# Patient Record
Sex: MALE | Race: WHITE | URBAN - METROPOLITAN AREA
[De-identification: names, ages, dates, MRNs, and addresses within clinical notes are randomized per-mention and may not be internally consistent; named-entity substitution may affect disease eponyms.]

---

## 2020-09-21 DIAGNOSIS — E11.9 CONTROLLED TYPE 2 DIABETES MELLITUS WITHOUT COMPLICATION, UNSPECIFIED WHETHER LONG TERM INSULIN USE (HCC): Primary | ICD-10-CM

## 2020-09-21 RX ORDER — METFORMIN HYDROCHLORIDE 500 MG/1
500 TABLET, EXTENDED RELEASE ORAL 2 TIMES DAILY WITH MEALS
Qty: 90 TABLET | Refills: 1 | Status: SHIPPED | OUTPATIENT
Start: 2020-09-21 | End: 2020-09-29 | Stop reason: CLARIF

## 2020-09-21 RX ORDER — METFORMIN HYDROCHLORIDE 500 MG/1
TABLET, EXTENDED RELEASE ORAL
COMMUNITY
End: 2020-09-21 | Stop reason: SDUPTHER

## 2024-02-28 ENCOUNTER — TELEPHONE (OUTPATIENT)
Age: 69
End: 2024-02-28

## 2024-02-28 NOTE — TELEPHONE ENCOUNTER
Caller: Patient    Doctor: Sangita Hooks    Reason for call: Patient called to schedule appointment for Trigger Finger, requesting Dr Hooks.  Patient was scheduled for initial visit 4/24.    Patient would just like to know if there is any inherent risk or issue with waiting this period (~2 month) for the appointment, as he is not familiar with Trigger Finger or if it could worsen with time.    Please call patient to advise.    Call back#: [phone]

## 2024-03-27 ENCOUNTER — OFFICE VISIT (OUTPATIENT)
Dept: OBGYN CLINIC | Facility: HOSPITAL | Age: 69
End: 2024-03-27
Payer: MEDICARE

## 2024-03-27 VITALS
DIASTOLIC BLOOD PRESSURE: 76 MMHG | BODY MASS INDEX: 29.87 KG/M2 | WEIGHT: 253 LBS | HEIGHT: 77 IN | HEART RATE: 71 BPM | SYSTOLIC BLOOD PRESSURE: 144 MMHG

## 2024-03-27 DIAGNOSIS — M65.331 TRIGGER MIDDLE FINGER OF RIGHT HAND: Primary | ICD-10-CM

## 2024-03-27 PROCEDURE — 99204 OFFICE O/P NEW MOD 45 MIN: CPT | Performed by: ORTHOPAEDIC SURGERY

## 2024-03-27 PROCEDURE — 20550 NJX 1 TENDON SHEATH/LIGAMENT: CPT | Performed by: ORTHOPAEDIC SURGERY

## 2024-03-27 RX ORDER — LIDOCAINE HYDROCHLORIDE 10 MG/ML
1 INJECTION, SOLUTION INFILTRATION; PERINEURAL
Status: COMPLETED | OUTPATIENT
Start: 2024-03-27 | End: 2024-03-27

## 2024-03-27 RX ORDER — BETAMETHASONE SODIUM PHOSPHATE AND BETAMETHASONE ACETATE 3; 3 MG/ML; MG/ML
6 INJECTION, SUSPENSION INTRA-ARTICULAR; INTRALESIONAL; INTRAMUSCULAR; SOFT TISSUE
Status: COMPLETED | OUTPATIENT
Start: 2024-03-27 | End: 2024-03-27

## 2024-03-27 RX ORDER — ATORVASTATIN CALCIUM 20 MG/1
20 TABLET, FILM COATED ORAL DAILY
COMMUNITY

## 2024-03-27 RX ORDER — ATENOLOL 25 MG/1
1 TABLET ORAL EVERY MORNING
COMMUNITY

## 2024-03-27 RX ADMIN — LIDOCAINE HYDROCHLORIDE 1 ML: 10 INJECTION, SOLUTION INFILTRATION; PERINEURAL at 13:15

## 2024-03-27 RX ADMIN — BETAMETHASONE SODIUM PHOSPHATE AND BETAMETHASONE ACETATE 6 MG: 3; 3 INJECTION, SUSPENSION INTRA-ARTICULAR; INTRALESIONAL; INTRAMUSCULAR; SOFT TISSUE at 13:15

## 2024-03-27 NOTE — PROGRESS NOTES
ASSESSMENT/PLAN:    Assessment:   Trigger Finger  right  long finger    Plan:   CS injection offered and accepted at time of visit, patient tolerated well  Resume activities as tolerated  Patient will follow-up in 6-8 weeks for re-evaluation    Follow Up:  6-8 weeks    To Do Next Visit:   Re-evaluation    General Discussions:  Trigger FInger: The anatomy and physiology of trigger finger was discussed with the patient today in the office.  Edema and increased contact pressure within the flexor tendons at the A1 pulley can cause pain, crepitation, and limitation of function.  Treatment options include resting MP blocking splints to decrease edema, oral anti-inflammatory medications, home or formal therapy exercises, up to 2 steroid injections within the tendon sheath, or surgical release.  While majority of patients do respond to conservative treatment, up to 20% may require surgical release.     Operative Discussions:    _____________________________________________________  CHIEF COMPLAINT:  Chief Complaint   Patient presents with    Right Middle Finger - Clicking         SUBJECTIVE:  Tony Cabral is a 68 y.o. male who presents with right long finger clicking and locking that started approximately 2 months ago with no known mechanism.  Radiation: None  Previous Treatments: None   Associated symptoms: Catching and Locking  Handedness: right  Work status: Consultant    Right middle finger - approximately 2 months.   Previous reaction to Kenalog injection    PAST MEDICAL HISTORY:  Past Medical History:   Diagnosis Date    DVT (deep venous thrombosis) (HCC) 2020    Sleep apnea        PAST SURGICAL HISTORY:  Past Surgical History:   Procedure Laterality Date    ANKLE SURGERY      CATARACT EXTRACTION, BILATERAL      KNEE SURGERY Bilateral     WISDOM TOOTH EXTRACTION         FAMILY HISTORY:  Family History   Problem Relation Age of Onset    COPD Mother     Heart attack Mother     Arthritis Mother     Arthritis Father   "   Heart attack Father        SOCIAL HISTORY:  Social History     Tobacco Use    Smoking status: Never    Smokeless tobacco: Never   Vaping Use    Vaping status: Never Used   Substance Use Topics    Alcohol use: Yes    Drug use: Never       MEDICATIONS:    Current Outpatient Medications:     atenolol (TENORMIN) 25 mg tablet, Take 1 tablet by mouth every morning, Disp: , Rfl:     atorvastatin (LIPITOR) 20 mg tablet, Take 20 mg by mouth daily, Disp: , Rfl:     Cyanocobalamin (VITAMIN B 12 PO), Take by mouth, Disp: , Rfl:     TAMSULOSIN HCL PO, Take by mouth, Disp: , Rfl:     ALLERGIES:  Allergies   Allergen Reactions    Triamcinolone Swelling       REVIEW OF SYSTEMS:  Pertinent items are noted in HPI.  A comprehensive review of systems was negative.    LABS:  HgA1c:   Lab Results   Component Value Date    HGBA1C 6.2 (H) 09/26/2023     BMP:   Lab Results   Component Value Date    CALCIUM 9.6 12/27/2023    K 4.1 12/27/2023    CO2 27 12/27/2023     12/27/2023    BUN 20 12/27/2023    CREATININE 1.32 (H) 12/27/2023         _____________________________________________________  PHYSICAL EXAMINATION:  Vital signs: /76   Pulse 71   Ht 6' 5\" (1.956 m)   Wt 115 kg (253 lb)   BMI 30.00 kg/m²   General: well developed and well nourished, alert, oriented times 3, and appears comfortable  Psychiatric: Normal  HEENT: Trachea Midline, No torticollis  Cardiovascular: No discernable arrhythmia  Pulmonary: No wheezing or stridor  Abdomen: No rebound or guarding  Extremities: No peripheral edema  Skin: No masses, erythema, lacerations, fluctation, ulcerations  Neurovascular: Sensation Intact to the Median, Ulnar, Radial Nerve, Motor Intact to the Median, Ulnar, Radial Nerve, and Pulses Intact    MUSCULOSKELETAL EXAMINATION:  Palpable clicking noted in the office today full range of motion.  Positive nodule.  No evidence of extensor tendon subluxation.  No other triggering to the remaining fingers.  Nodule noted in the " right hand long finger.  Flexor and extensor tendons are intact.      _____________________________________________________  STUDIES REVIEWED:  No Studies to review      PROCEDURES PERFORMED:  Hand/upper extremity injection: R long A1  Universal Protocol:  Consent: Verbal consent obtained.  Consent given by: patient  Timeout called at: 3/27/2024 1:37 PM.  Patient understanding: patient states understanding of the procedure being performed  Site marked: the operative site was marked  Patient identity confirmed: verbally with patient  Supporting Documentation  Indications: joint swelling and pain   Procedure Details  Condition:trigger finger Location: long finger - R long A1   Preparation: Patient was prepped and draped in the usual sterile fashion  Needle size: 25 G  Ultrasound guidance: no  Approach: volar  Medications administered: 1 mL lidocaine 1 %; 6 mg betamethasone acetate-betamethasone sodium phosphate 6 (3-3) mg/mL  Patient tolerance: patient tolerated the procedure well with no immediate complications  Dressing:  Sterile dressing applied           Scribe Attestation      I,:  Shelli An am acting as a scribe while in the presence of the attending physician.:       I,:  Jluis Hooks MD personally performed the services described in this documentation    as scribed in my presence.:

## 2024-03-27 NOTE — PROGRESS NOTES
"Orthopedic Surgery Office Note  Tony Cabral (68 y.o. male)   : 1955   MRN: [id number]   Encounter Date: 3/27/2024 with Dr. Narendra Hawkins,   Chief Complaint   Patient presents with   • Right Middle Finger - Clicking       Assessment, Plan, & Discussion:     ***  - ***    Plan:   No follow-ups on file.    Surgery:   { surgery dot phrases:89267}      Orders:     There are no diagnoses linked to this encounter.     History of Present Illness:     Tony Cabral is a 68 y.o. male who presents { visit reasonin} regarding ***    Review of Systems  Constitutional: Negative for fatigue, fever or loss of appetite.   HENT: Negative.    Respiratory: Negative for shortness of breath, dyspnea.    Cardiovascular: Negative for chest pain/tightness.   Gastrointestinal: Negative for abdominal pain, N/V.   Endocrine: Negative for cold/heat intolerance, unexplained weight loss/gain.   Genitourinary: Negative for flank pain, dysuria  Skin: Negative for rash.    Psychiatric/Behavioral: Negative for agitation.  All else negative unless otherwise noted in HPI    Physical Exam:   General:  /76   Pulse 71   Ht 6' 5\" (1.956 m)   Wt 115 kg (253 lb)   BMI 30.00 kg/m²   Cons: Appears well.  No apparent distress.  Psych: Alert. Oriented x3.  Mood and affect normal.  Eyes: PERRLA, EOMI  Resp: Normal effort.  No audible wheezing or stridor.  CV: Extremities warm and well perfused.   Abd:    No distention or guarding.   Skin: Warm. No visible lesions.  Neuro: Normal muscle tone.      Orthopedic Exam:   { physical exam:81648}      Imaging/Studies:     Study: ***  Date: ***  Report: { radiology:41961}  I have personally reviewed imaging and my impression is as follows: ***    Procedures  {NO PROCDOC:69984}      Medical, Surgical, Family, and Social History    The patient's medical history, family history, and social history, were reviewed and updated as appropriate.    Past Medical History:   Diagnosis " Date   • DVT (deep venous thrombosis) ([facility]) 2020   • Sleep apnea      Past Surgical History:   Procedure Laterality Date   • ANKLE SURGERY     • CATARACT EXTRACTION, BILATERAL     • KNEE SURGERY Bilateral    • WISDOM TOOTH EXTRACTION       Family History   Problem Relation Age of Onset   • COPD Mother    • Heart attack Mother    • Arthritis Mother    • Arthritis Father    • Heart attack Father      Social History     Occupational History   • Not on file   Tobacco Use   • Smoking status: Never   • Smokeless tobacco: Never   Vaping Use   • Vaping status: Never Used   Substance and Sexual Activity   • Alcohol use: Yes   • Drug use: Never   • Sexual activity: Not on file     Allergies   Allergen Reactions   • Triamcinolone Swelling       Current Outpatient Medications:   •  atenolol (TENORMIN) 25 mg tablet, Take 1 tablet by mouth every morning, Disp: , Rfl:   •  atorvastatin (LIPITOR) 20 mg tablet, Take 20 mg by mouth daily, Disp: , Rfl:   •  Cyanocobalamin (VITAMIN B 12 PO), Take by mouth, Disp: , Rfl:   •  TAMSULOSIN HCL PO, Take by mouth, Disp: , Rfl:       This Visit:     *** minutes was spent in the coordination of care, reviewing of imaging and with the patient on the date of service    Shelli Salazar Attestation    I,:   am acting as a scribe while in the presence of the attending physician.:       I,:   personally performed the services described in this documentation    as scribed in my presence.:           Dr. Narendra Hawkins DO, Orthopedic Surgeon  [facility]

## 2024-05-22 ENCOUNTER — OFFICE VISIT (OUTPATIENT)
Dept: OBGYN CLINIC | Facility: HOSPITAL | Age: 69
End: 2024-05-22
Payer: MEDICARE

## 2024-05-22 VITALS
BODY MASS INDEX: 28.93 KG/M2 | SYSTOLIC BLOOD PRESSURE: 136 MMHG | HEIGHT: 77 IN | WEIGHT: 245 LBS | HEART RATE: 65 BPM | DIASTOLIC BLOOD PRESSURE: 70 MMHG

## 2024-05-22 DIAGNOSIS — M65.331 TRIGGER MIDDLE FINGER OF RIGHT HAND: Primary | ICD-10-CM

## 2024-05-22 PROCEDURE — 99213 OFFICE O/P EST LOW 20 MIN: CPT | Performed by: ORTHOPAEDIC SURGERY

## 2024-05-22 NOTE — PROGRESS NOTES
ASSESSMENT/PLAN:    Assessment:   Trigger Finger  right  long finger    Plan:   Patient was advised that we we will hold off on the cortisone injection at this time due to minimal symptoms  He was advised that we cannot try to cortisone injections for this issue and then we would discuss surgical intervention  He was advised use heat massage as demonstrated in the office  He will follow-up with us as needed    Follow Up:  As needed    To Do Next Visit:   Re-evaluation    General Discussions:  Trigger FInger: The anatomy and physiology of trigger finger was discussed with the patient today in the office.  Edema and increased contact pressure within the flexor tendons at the A1 pulley can cause pain, crepitation, and limitation of function.  Treatment options include resting MP blocking splints to decrease edema, oral anti-inflammatory medications, home or formal therapy exercises, up to 2 steroid injections within the tendon sheath, or surgical release.  While majority of patients do respond to conservative treatment, up to 20% may require surgical release.     Operative Discussions:    _____________________________________________________  CHIEF COMPLAINT:  Chief Complaint   Patient presents with    Right Middle Finger - Clicking     TF Injection #1 - 3/27         SUBJECTIVE:  Tony Cabral is a 68 y.o. male who presents for follow-up regarding right long trigger finger.  He states that the cortisone injection worked very well in regards to the clicking and locking.  He states there was minimal discomfort.  He states occasionally he will still note a little click in his finger.        PAST MEDICAL HISTORY:  Past Medical History:   Diagnosis Date    DVT (deep venous thrombosis) (HCC) 2020    Sleep apnea        PAST SURGICAL HISTORY:  Past Surgical History:   Procedure Laterality Date    ANKLE SURGERY      CATARACT EXTRACTION, BILATERAL      KNEE SURGERY Bilateral     WISDOM TOOTH EXTRACTION         FAMILY  "HISTORY:  Family History   Problem Relation Age of Onset    COPD Mother     Heart attack Mother     Arthritis Mother     Arthritis Father     Heart attack Father        SOCIAL HISTORY:  Social History     Tobacco Use    Smoking status: Never    Smokeless tobacco: Never   Vaping Use    Vaping status: Never Used   Substance Use Topics    Alcohol use: Yes    Drug use: Never       MEDICATIONS:    Current Outpatient Medications:     atenolol (TENORMIN) 25 mg tablet, Take 1 tablet by mouth every morning, Disp: , Rfl:     atorvastatin (LIPITOR) 20 mg tablet, Take 20 mg by mouth daily, Disp: , Rfl:     Cyanocobalamin (VITAMIN B 12 PO), Take by mouth, Disp: , Rfl:     TAMSULOSIN HCL PO, Take by mouth, Disp: , Rfl:     ALLERGIES:  Allergies   Allergen Reactions    Triamcinolone Swelling       REVIEW OF SYSTEMS:  Pertinent items are noted in HPI.  A comprehensive review of systems was negative.    LABS:  HgA1c:   Lab Results   Component Value Date    HGBA1C 6.2 (H) 09/26/2023     BMP:   Lab Results   Component Value Date    CALCIUM 9.6 12/27/2023    K 4.1 12/27/2023    CO2 27 12/27/2023     12/27/2023    BUN 20 12/27/2023    CREATININE 1.32 (H) 12/27/2023         _____________________________________________________  PHYSICAL EXAMINATION:  Vital signs: /70   Pulse 65   Ht 6' 5\" (1.956 m)   Wt 111 kg (245 lb)   BMI 29.05 kg/m²   General: well developed and well nourished, alert, oriented times 3, and appears comfortable  Psychiatric: Normal  HEENT: Trachea Midline, No torticollis  Cardiovascular: No discernable arrhythmia  Pulmonary: No wheezing or stridor  Abdomen: No rebound or guarding  Extremities: No peripheral edema  Skin: No masses, erythema, lacerations, fluctation, ulcerations  Neurovascular: Sensation Intact to the Median, Ulnar, Radial Nerve, Motor Intact to the Median, Ulnar, Radial Nerve, and Pulses Intact    MUSCULOSKELETAL EXAMINATION:  right long finger:  Positive palpable nodule over the A1 " pulley.  Negative tenderness to palpation over A1 pulley. Positive clicking.   Negative catching.         _____________________________________________________  STUDIES REVIEWED:  No Studies to review      PROCEDURES PERFORMED:  Procedures  Procedures were performed at today's visit

## 2024-12-02 ENCOUNTER — OFFICE VISIT (OUTPATIENT)
Dept: OBGYN CLINIC | Facility: CLINIC | Age: 69
End: 2024-12-02
Payer: MEDICARE

## 2024-12-02 VITALS — WEIGHT: 242.2 LBS | BODY MASS INDEX: 28.6 KG/M2 | HEIGHT: 77 IN

## 2024-12-02 DIAGNOSIS — M65.331 TRIGGER MIDDLE FINGER OF RIGHT HAND: Primary | ICD-10-CM

## 2024-12-02 PROCEDURE — 99213 OFFICE O/P EST LOW 20 MIN: CPT | Performed by: ORTHOPAEDIC SURGERY

## 2024-12-02 PROCEDURE — 20550 NJX 1 TENDON SHEATH/LIGAMENT: CPT | Performed by: PHYSICIAN ASSISTANT

## 2024-12-02 RX ORDER — LIDOCAINE HYDROCHLORIDE 10 MG/ML
1 INJECTION, SOLUTION INFILTRATION; PERINEURAL
Status: COMPLETED | OUTPATIENT
Start: 2024-12-02 | End: 2024-12-02

## 2024-12-02 RX ORDER — BETAMETHASONE SODIUM PHOSPHATE AND BETAMETHASONE ACETATE 3; 3 MG/ML; MG/ML
6 INJECTION, SUSPENSION INTRA-ARTICULAR; INTRALESIONAL; INTRAMUSCULAR; SOFT TISSUE
Status: COMPLETED | OUTPATIENT
Start: 2024-12-02 | End: 2024-12-02

## 2024-12-02 RX ADMIN — LIDOCAINE HYDROCHLORIDE 1 ML: 10 INJECTION, SOLUTION INFILTRATION; PERINEURAL at 12:00

## 2024-12-02 RX ADMIN — BETAMETHASONE SODIUM PHOSPHATE AND BETAMETHASONE ACETATE 6 MG: 3; 3 INJECTION, SUSPENSION INTRA-ARTICULAR; INTRALESIONAL; INTRAMUSCULAR; SOFT TISSUE at 12:00

## 2024-12-02 NOTE — PROGRESS NOTES
ASSESSMENT/PLAN:    Assessment:   Trigger Finger  right  long finger    Plan:   Patient was given his second right long trigger finger cortisone injection today.  He tolerated well.  He was advised that if it were to return we would discuss surgical intervention  He will follow-up in 6-8 weeks for reevaluation    Follow Up:  6-8 weeks    To Do Next Visit:   Re-evaluation    General Discussions:  Trigger FInger: The anatomy and physiology of trigger finger was discussed with the patient today in the office.  Edema and increased contact pressure within the flexor tendons at the A1 pulley can cause pain, crepitation, and limitation of function.  Treatment options include resting MP blocking splints to decrease edema, oral anti-inflammatory medications, home or formal therapy exercises, up to 2 steroid injections within the tendon sheath, or surgical release.  While majority of patients do respond to conservative treatment, up to 20% may require surgical release.     Operative Discussions:    _____________________________________________________  CHIEF COMPLAINT:  Chief Complaint   Patient presents with    Right Middle Finger - Clicking     TF Injection #1 - 3/27         SUBJECTIVE:  Tony Cabral is a 69 y.o. male who presents for follow-up regarding right long trigger finger.  He states that the cortisone injection worked very well in regards to the clicking and locking.  He states that it lasted only for a few months.  He states that he would like to try the second cortisone injection for this issue.        PAST MEDICAL HISTORY:  Past Medical History:   Diagnosis Date    DVT (deep venous thrombosis) (HCC) 2020    Sleep apnea        PAST SURGICAL HISTORY:  Past Surgical History:   Procedure Laterality Date    ANKLE SURGERY      CATARACT EXTRACTION, BILATERAL      KNEE SURGERY Bilateral     WISDOM TOOTH EXTRACTION         FAMILY HISTORY:  Family History   Problem Relation Age of Onset    COPD Mother     Heart  "attack Mother     Arthritis Mother     Arthritis Father     Heart attack Father        SOCIAL HISTORY:  Social History     Tobacco Use    Smoking status: Never    Smokeless tobacco: Never   Vaping Use    Vaping status: Never Used   Substance Use Topics    Alcohol use: Yes    Drug use: Never       MEDICATIONS:    Current Outpatient Medications:     atenolol (TENORMIN) 25 mg tablet, Take 1 tablet by mouth every morning, Disp: , Rfl:     atorvastatin (LIPITOR) 20 mg tablet, Take 20 mg by mouth daily, Disp: , Rfl:     Cyanocobalamin (VITAMIN B 12 PO), Take by mouth, Disp: , Rfl:     TAMSULOSIN HCL PO, Take by mouth, Disp: , Rfl:     ALLERGIES:  Allergies   Allergen Reactions    Triamcinolone Swelling       REVIEW OF SYSTEMS:  Pertinent items are noted in HPI.  A comprehensive review of systems was negative.    LABS:  HgA1c:   Lab Results   Component Value Date    HGBA1C 5.8 (H) 07/11/2024     BMP:   Lab Results   Component Value Date    CALCIUM 9.1 07/11/2024    K 4.5 07/11/2024    CO2 22 07/11/2024     07/11/2024    BUN 21 07/11/2024    CREATININE 1.14 07/11/2024         _____________________________________________________  PHYSICAL EXAMINATION:  Vital signs: Ht 6' 5\" (1.956 m)   Wt 110 kg (242 lb 3.2 oz)   BMI 28.72 kg/m²   General: well developed and well nourished, alert, oriented times 3, and appears comfortable  Psychiatric: Normal  HEENT: Trachea Midline, No torticollis  Cardiovascular: No discernable arrhythmia  Pulmonary: No wheezing or stridor  Abdomen: No rebound or guarding  Extremities: No peripheral edema  Skin: No masses, erythema, lacerations, fluctation, ulcerations  Neurovascular: Sensation Intact to the Median, Ulnar, Radial Nerve, Motor Intact to the Median, Ulnar, Radial Nerve, and Pulses Intact    MUSCULOSKELETAL EXAMINATION:  right long finger:  Positive palpable nodule over the A1 pulley.  Negative tenderness to palpation over A1 pulley. Positive clicking.   Negative catching. " "        _____________________________________________________  STUDIES REVIEWED:  No Studies to review      PROCEDURES PERFORMED:  Hand/upper extremity injection: R long A1  Universal Protocol:  Consent: Verbal consent obtained.  Risks and benefits: risks, benefits and alternatives were discussed  Consent given by: patient  Time out: Immediately prior to procedure a \"time out\" was called to verify the correct patient, procedure, equipment, support staff and site/side marked as required.  Patient understanding: patient states understanding of the procedure being performed  Patient consent: the patient's understanding of the procedure matches consent given  Site marked: the operative site was marked  Patient identity confirmed: verbally with patient  Supporting Documentation  Indications: pain   Procedure Details  Condition:trigger finger Location: long finger - R long A1   Preparation: Patient was prepped and draped in the usual sterile fashion  Needle size: 25 G  Approach: volar  Medications administered: 1 mL lidocaine 1 %; 6 mg betamethasone acetate-betamethasone sodium phosphate 6 (3-3) mg/mL  Patient tolerance: patient tolerated the procedure well with no immediate complications  Dressing:  Sterile dressing applied           "

## 2025-01-29 ENCOUNTER — OFFICE VISIT (OUTPATIENT)
Dept: OBGYN CLINIC | Facility: HOSPITAL | Age: 70
End: 2025-01-29
Payer: MEDICARE

## 2025-01-29 VITALS — WEIGHT: 242 LBS | BODY MASS INDEX: 28.57 KG/M2 | HEIGHT: 77 IN

## 2025-01-29 DIAGNOSIS — M65.331 TRIGGER MIDDLE FINGER OF RIGHT HAND: Primary | ICD-10-CM

## 2025-01-29 PROCEDURE — 99213 OFFICE O/P EST LOW 20 MIN: CPT | Performed by: ORTHOPAEDIC SURGERY

## 2025-01-29 NOTE — PROGRESS NOTES
ASSESSMENT/PLAN:    Assessment:   Trigger Finger  right  long finger    Plan:   Patient has had 2 trigger finger injection and is please with the results.  Discussed if symptoms worsen we can consider surgical intervention    Follow Up:  PRN    General Discussions:     Trigger FInger: The anatomy and physiology of trigger finger was discussed with the patient today in the office.  Edema and increased contact pressure within the flexor tendons at the A1 pulley can cause pain, crepitation, and limitation of function.  Treatment options include resting MP blocking splints to decrease edema, oral anti-inflammatory medications, home or formal therapy exercises, up to 2 steroid injections within the tendon sheath, or surgical release.  While majority of patients do respond to conservative treatment, up to 20% may require surgical release.     _____________________________________________________  CHIEF COMPLAINT:  Chief Complaint   Patient presents with   • Right Middle Finger - Clicking     TF Injection #2 - 12/2/24         SUBJECTIVE:  Tony Cabral is a 69 y.o. male who presents for follow up regarding Trigger Finger  right  long finger.  Since last visit, Tony Cabral has tried steroid injections with relief.  Today there is mild Catching to the right long finger.    Radiation: None  Associated symptoms: Catching   Handedness: right      PAST MEDICAL HISTORY:  Past Medical History:   Diagnosis Date   • DVT (deep venous thrombosis) ([facility]) 2020   • Sleep apnea        PAST SURGICAL HISTORY:  Past Surgical History:   Procedure Laterality Date   • ANKLE SURGERY     • CATARACT EXTRACTION, BILATERAL     • KNEE SURGERY Bilateral    • WISDOM TOOTH EXTRACTION         FAMILY HISTORY:  Family History   Problem Relation Age of Onset   • COPD Mother    • Heart attack Mother    • Arthritis Mother    • Arthritis Father    • Heart attack Father        SOCIAL HISTORY:  Social History     Tobacco Use   • Smoking status: Never  "  • Smokeless tobacco: Never   Vaping Use   • Vaping status: Never Used   Substance Use Topics   • Alcohol use: Yes   • Drug use: Never       MEDICATIONS:    Current Outpatient Medications:   •  atenolol (TENORMIN) 25 mg tablet, Take 1 tablet by mouth every morning, Disp: , Rfl:   •  atorvastatin (LIPITOR) 20 mg tablet, Take 20 mg by mouth daily, Disp: , Rfl:   •  Cyanocobalamin (VITAMIN B 12 PO), Take by mouth, Disp: , Rfl:   •  TAMSULOSIN HCL PO, Take by mouth, Disp: , Rfl:     ALLERGIES:  Allergies   Allergen Reactions   • Triamcinolone Swelling       REVIEW OF SYSTEMS:  Pertinent items are noted in HPI.  A comprehensive review of systems was negative.    LABS:  HgA1c:   Lab Results   Component Value Date    HGBA1C 5.8 (H) 07/11/2024     BMP:   Lab Results   Component Value Date    CALCIUM 9.1 07/11/2024    K 4.5 07/11/2024    CO2 22 07/11/2024     07/11/2024    BUN 21 07/11/2024    CREATININE 1.14 07/11/2024           _____________________________________________________  PHYSICAL EXAMINATION:  Vital signs: Ht 6' 5\" (1.956 m)   Wt 110 kg (242 lb)   BMI 28.70 kg/m²   General: well developed and well nourished, alert, oriented times 3, and appears comfortable  Psychiatric: Normal  HEENT: Trachea Midline, No torticollis  Cardiovascular: No discernable arrhythmia  Pulmonary: No wheezing or stridor  Abdomen: No rebound or guarding  Extremities: No peripheral edema  Skin: No masses, erythema, lacerations, fluctation, ulcerations  Neurovascular: Sensation Intact to the Median, Ulnar, Radial Nerve, Motor Intact to the Median, Ulnar, Radial Nerve, and Pulses Intact    MUSCULOSKELETAL EXAMINATION:    right long finger:  Positive palpable nodule over the A1 pulley.  Negative tenderness to palpation over A1 pulley. Positive clicking.   Negative catching.   Mild crepitation to the ring finger  _____________________________________________________  STUDIES REVIEWED:  No Studies to review      PROCEDURES " PERFORMED:  Procedures  No Procedures performed today  Scribe Attestation    I,:  Olga Sarmiento MA am acting as a scribe while in the presence of the attending physician.:       I,:  Jluis Hooks MD personally performed the services described in this documentation    as scribed in my presence.:

## 2025-06-19 ENCOUNTER — TELEPHONE (OUTPATIENT)
Age: 70
End: 2025-06-19

## 2025-06-19 NOTE — TELEPHONE ENCOUNTER
NP for elevated PSA, reading on 6/18/25 4.5, PSA on 9/26/23 2.77    Started Tamsulosin a year ago due to weaker stream. Patient having hip surgery on 7/15/25    Scheduled first available on 6/30/25

## 2025-06-30 ENCOUNTER — OFFICE VISIT (OUTPATIENT)
Dept: UROLOGY | Facility: MEDICAL CENTER | Age: 70
End: 2025-06-30

## 2025-06-30 VITALS
WEIGHT: 239 LBS | OXYGEN SATURATION: 99 % | BODY MASS INDEX: 28.22 KG/M2 | DIASTOLIC BLOOD PRESSURE: 62 MMHG | HEIGHT: 77 IN | SYSTOLIC BLOOD PRESSURE: 120 MMHG | HEART RATE: 74 BPM

## 2025-06-30 DIAGNOSIS — R97.20 ELEVATED PSA: Primary | ICD-10-CM

## 2025-06-30 DIAGNOSIS — N52.9 ERECTILE DYSFUNCTION, UNSPECIFIED ERECTILE DYSFUNCTION TYPE: ICD-10-CM

## 2025-06-30 LAB
SL AMB  POCT GLUCOSE, UA: ABNORMAL
SL AMB LEUKOCYTE ESTERASE,UA: ABNORMAL
SL AMB POCT BILIRUBIN,UA: ABNORMAL
SL AMB POCT BLOOD,UA: ABNORMAL
SL AMB POCT CLARITY,UA: CLEAR
SL AMB POCT COLOR,UA: YELLOW
SL AMB POCT KETONES,UA: ABNORMAL
SL AMB POCT NITRITE,UA: ABNORMAL
SL AMB POCT PH,UA: 5.5
SL AMB POCT SPECIFIC GRAVITY,UA: >=1.03
SL AMB POCT URINE PROTEIN: ABNORMAL
SL AMB POCT UROBILINOGEN: 0.2

## 2025-06-30 RX ORDER — AMLODIPINE BESYLATE 5 MG/1
5 TABLET ORAL DAILY
COMMUNITY
Start: 2025-04-06

## 2025-06-30 RX ORDER — TADALAFIL 10 MG/1
10 TABLET ORAL
Qty: 20 TABLET | Refills: 0 | Status: SHIPPED | OUTPATIENT
Start: 2025-06-30

## 2025-06-30 RX ORDER — TAMSULOSIN HYDROCHLORIDE 0.4 MG/1
0.4 CAPSULE ORAL
COMMUNITY
Start: 2025-06-16

## 2025-06-30 RX ORDER — CICLOPIROX OLAMINE 7.7 MG/G
1 CREAM TOPICAL 2 TIMES DAILY PRN
COMMUNITY

## 2025-06-30 RX ORDER — NAPROXEN 500 MG/1
500 TABLET ORAL 2 TIMES DAILY WITH MEALS
COMMUNITY
Start: 2025-06-10

## 2025-06-30 RX ORDER — TADALAFIL 10 MG/1
10 TABLET ORAL DAILY PRN
Qty: 20 TABLET | Refills: 0 | Status: SHIPPED | OUTPATIENT
Start: 2025-06-30 | End: 2025-06-30

## 2025-06-30 RX ORDER — MUPIROCIN 2 %
OINTMENT (GRAM) TOPICAL
COMMUNITY
Start: 2025-06-25

## 2025-06-30 NOTE — PROGRESS NOTES
Name: Tony Cabral      : 1955      MRN: [id number]  Encounter Provider: ANAI Maza  Encounter Date: 2025   [facility]  :  Assessment & Plan  Elevated PSA  Most recent PSA was 4.5 (25)  Prior PSA's: 2.77 (23), 3.02 (7/3/23), 3.19 (6/3/22), 2.76 (10/20/20), 2.31 (19), 2.67 (18)  He denies a family history of prostate cancer  SEAN today palpably benign   Recommend repeat PSA now  Discussed mpMRI of the prostate if PSA remains elevated   Orders:    POCT urine dip auto non-scope    PSA Total, Diagnostic; Future    Erectile dysfunction, unspecified erectile dysfunction type  Patient reports difficulty obtaining erections for the past 1-2 years  He denies any new relationships   No new health diagnoses  He has not tried any medications in the past   Recommend starting Cialis 10mg as needed  Usage and side effects discussed  Follow up in 6 months  Orders:    tadalafil (CIALIS) 10 MG tablet; Take 1 tablet (10 mg total) by mouth every third day as needed for erectile dysfunction 1 hour prior to sexual activity      History of Present Illness   Tony Cabral is a 69 y.o. male who presents in consultation for elevated PSA, referred to our office by his PCP, Dr. Gibbs. His most recent PSA was 4.5 on 25, increased from 2.77 on 23. Prior PSA's ranged from 2.3 () to 3.19 (). He denies a family history of prostate cancer.   He is currently managed on Tamsulosin 0.4mg nightly for BPH w/ weak urinary stream. He reports stable urinary symptoms. He is not bothered by this and feels that he empties his bladder fully.   He also reports erectile dysfunction that started about 1-2 years ago. He has not previously tried any medications or devices in the past. He is interested in trying a medication.       AUA SYMPTOM SCORE      Flowsheet Row Most Recent Value   AUA SYMPTOM SCORE    How often have you had a sensation of not  emptying your bladder completely after you finished urinating? 0 (P)     How often have you had to urinate again less than two hours after you finished urinating? 3 (P)     How often have you found you stopped and started again several times when you urinate? 5 (P)     How often have you found it difficult to postpone urination? 0 (P)     How often have you had a weak urinary stream? 5 (P)     How often have you had to push or strain to begin urination? 0 (P)     How many times did you most typically get up to urinate from the time you went to bed at night until the time you got up in the morning? 1 (P)     Quality of Life: If you were to spend the rest of your life with your urinary condition just the way it is now, how would you feel about that? 2 (P)     AUA SYMPTOM SCORE 14 (P)            Review of Systems   Constitutional:  Negative for chills, diaphoresis, fatigue and fever.   Respiratory:  Negative for shortness of breath.    Gastrointestinal:  Negative for abdominal pain and nausea.   Genitourinary:  Negative for difficulty urinating, dysuria, flank pain, frequency, hematuria and urgency.        Weak stream   Musculoskeletal:  Negative for back pain.   Skin:  Negative for pallor.   Neurological:  Negative for dizziness, light-headedness and numbness.     Pertinent Medical History       Medical History Reviewed by provider this encounter:     .  Past Medical History   Past Medical History[1]  Past Surgical History[2]  Family History[3]   reports that he has never smoked. He has never used smokeless tobacco. He reports current alcohol use of about 8.0 standard drinks of alcohol per week. He reports that he does not use drugs.  Current Outpatient Medications   Medication Instructions    amLODIPine (NORVASC) 5 mg, Oral, Daily    atenolol (TENORMIN) 25 mg tablet 1 tablet, Oral, Every morning    atorvastatin (LIPITOR) 20 mg, Oral, Daily    Cholecalciferol 1,000 Units, Every morning    ciclopirox (LOPROX) 0.77 %  "cream 1 Application, Apply externally, 2 times daily PRN    Cyanocobalamin (VITAMIN B 12 PO) Oral    mupirocin (BACTROBAN) 2 % ointment begin 5 days prior to surgery. apply to nostrils.    naproxen (NAPROSYN) 500 mg, Oral, 2 times daily with meals    tadalafil (CIALIS) 10 mg, Oral, Daily PRN    tamsulosin (FLOMAX) 0.4 mg, Oral   Allergies[4]   Medications Ordered Prior to Encounter[5]   Social History[6]     Objective   /62 (BP Location: Left arm, Patient Position: Sitting, Cuff Size: Adult)   Pulse 74   Ht 6' 5\" (1.956 m)   Wt 108 kg (239 lb)   SpO2 99%   BMI 28.34 kg/m²     Physical Exam  Constitutional:       Appearance: Normal appearance.   HENT:      Head: Normocephalic and atraumatic.     Eyes:      Conjunctiva/sclera: Conjunctivae normal.     Pulmonary:      Effort: Pulmonary effort is normal. No respiratory distress.   Genitourinary:     Comments: SEAN is palpably enlarged but benign. No nodules, irregularities or areas of tenderness.      Musculoskeletal:         General: Normal range of motion.      Cervical back: Normal range of motion.     Skin:     General: Skin is warm and dry.     Neurological:      General: No focal deficit present.      Mental Status: He is alert and oriented to person, place, and time.     Psychiatric:         Mood and Affect: Mood normal.         Behavior: Behavior normal.           Results   Lab Results   Component Value Date    CALCIUM 9.3 06/18/2025    K 4.9 06/18/2025    CO2 29 06/18/2025     06/18/2025    BUN 28 06/18/2025    CREATININE 1.15 06/18/2025     Lab Results   Component Value Date    HGB 12.8 08/02/2022    HCT 36.6 (L) 08/02/2022       Office Urine Dip  Recent Results (from the past hour)   POCT urine dip auto non-scope    Collection Time: 06/30/25  8:43 AM   Result Value Ref Range     COLOR,UA yellow     CLARITY,UA clear     SPECIFIC GRAVITY,UA >=1.030      PH,UA 5.5     LEUKOCYTE ESTERASE,UA neg     NITRITE,UA neg     GLUCOSE, UA neg     KETONES,UA " neg     BILIRUBIN,UA small     BLOOD,UA neg     POCT URINE PROTEIN trace     SL AMB POCT UROBILINOGEN 0.2             [1]   Past Medical History:  Diagnosis Date    DVT (deep venous thrombosis) (HCC) 2020    Erectile dysfunction     Fractures     Hypertension     Sleep apnea    [2]   Past Surgical History:  Procedure Laterality Date    ANKLE SURGERY      CATARACT EXTRACTION, BILATERAL      HIP SURGERY      JOINT REPLACEMENT      KNEE SURGERY Bilateral     TENDON REPAIR      VASECTOMY      WISDOM TOOTH EXTRACTION     [3]   Family History  Problem Relation Name Age of Onset    COPD Mother Paola     Heart attack Mother Paola     Arthritis Mother Paola     Diabetes Mother Paola     Arthritis Father Florencio     Heart attack Father Florencio     Cancer Father Florencio     Diabetes Father Florencio     Cancer Brother Marvin    [4]   Allergies  Allergen Reactions    Triamcinolone Swelling    Oxycodone Palpitations     Did not tolerate oral oxy   [5]   Current Outpatient Medications on File Prior to Visit   Medication Sig Dispense Refill    amLODIPine (NORVASC) 5 mg tablet Take 5 mg by mouth daily      atenolol (TENORMIN) 25 mg tablet Take 1 tablet by mouth every morning      atorvastatin (LIPITOR) 20 mg tablet Take 20 mg by mouth in the morning.      Cholecalciferol 25 MCG (1000 UT) capsule Take 1,000 Units by mouth every morning      ciclopirox (LOPROX) 0.77 % cream Apply 1 Application topically 2 (two) times a day as needed      Cyanocobalamin (VITAMIN B 12 PO) Take by mouth      naproxen (NAPROSYN) 500 mg tablet Take 500 mg by mouth 2 (two) times a day with meals      tamsulosin (FLOMAX) 0.4 mg Take 0.4 mg by mouth      [DISCONTINUED] TAMSULOSIN HCL PO Take by mouth      mupirocin (BACTROBAN) 2 % ointment begin 5 days prior to surgery. apply to nostrils. (Patient not taking: Reported on 6/30/2025)       No current facility-administered medications on file prior to visit.   [6]   Social History  Tobacco Use    Smoking  status: Never    Smokeless tobacco: Never   Vaping Use    Vaping status: Never Used   Substance and Sexual Activity    Alcohol use: Yes     Alcohol/week: 8.0 standard drinks of alcohol     Types: 8 Glasses of wine per week    Drug use: Never    Sexual activity: Not Currently     Partners: Female     Birth control/protection: None

## 2025-07-01 LAB — PSA SERPL-MCNC: 3.97 NG/ML

## 2025-07-03 DIAGNOSIS — R97.20 ELEVATED PSA: Primary | ICD-10-CM
